# Patient Record
Sex: MALE | Race: WHITE | Employment: PART TIME | URBAN - NONMETROPOLITAN AREA
[De-identification: names, ages, dates, MRNs, and addresses within clinical notes are randomized per-mention and may not be internally consistent; named-entity substitution may affect disease eponyms.]

---

## 2024-05-27 ENCOUNTER — HOSPITAL ENCOUNTER (EMERGENCY)
Facility: HOSPITAL | Age: 29
Discharge: HOME | End: 2024-05-27
Attending: STUDENT IN AN ORGANIZED HEALTH CARE EDUCATION/TRAINING PROGRAM | Admitting: STUDENT IN AN ORGANIZED HEALTH CARE EDUCATION/TRAINING PROGRAM
Payer: COMMERCIAL

## 2024-05-27 ENCOUNTER — APPOINTMENT (OUTPATIENT)
Dept: GENERAL RADIOLOGY | Facility: HOSPITAL | Age: 29
End: 2024-05-27
Attending: STUDENT IN AN ORGANIZED HEALTH CARE EDUCATION/TRAINING PROGRAM
Payer: COMMERCIAL

## 2024-05-27 VITALS
BODY MASS INDEX: 29.94 KG/M2 | DIASTOLIC BLOOD PRESSURE: 97 MMHG | RESPIRATION RATE: 16 BRPM | SYSTOLIC BLOOD PRESSURE: 154 MMHG | WEIGHT: 244.38 LBS | OXYGEN SATURATION: 100 % | TEMPERATURE: 98 F | HEART RATE: 62 BPM

## 2024-05-27 DIAGNOSIS — R07.9 CHEST PAIN, UNSPECIFIED TYPE: ICD-10-CM

## 2024-05-27 PROCEDURE — 93005 ELECTROCARDIOGRAM TRACING: CPT

## 2024-05-27 PROCEDURE — 71046 X-RAY EXAM CHEST 2 VIEWS: CPT

## 2024-05-27 PROCEDURE — 93010 ELECTROCARDIOGRAM REPORT: CPT | Performed by: INTERNAL MEDICINE

## 2024-05-27 PROCEDURE — 99284 EMERGENCY DEPT VISIT MOD MDM: CPT | Performed by: STUDENT IN AN ORGANIZED HEALTH CARE EDUCATION/TRAINING PROGRAM

## 2024-05-27 PROCEDURE — 99284 EMERGENCY DEPT VISIT MOD MDM: CPT | Mod: 25

## 2024-05-27 ASSESSMENT — ACTIVITIES OF DAILY LIVING (ADL): ADLS_ACUITY_SCORE: 33

## 2024-05-27 ASSESSMENT — COLUMBIA-SUICIDE SEVERITY RATING SCALE - C-SSRS
2. HAVE YOU ACTUALLY HAD ANY THOUGHTS OF KILLING YOURSELF IN THE PAST MONTH?: NO
6. HAVE YOU EVER DONE ANYTHING, STARTED TO DO ANYTHING, OR PREPARED TO DO ANYTHING TO END YOUR LIFE?: NO
1. IN THE PAST MONTH, HAVE YOU WISHED YOU WERE DEAD OR WISHED YOU COULD GO TO SLEEP AND NOT WAKE UP?: NO

## 2024-05-27 NOTE — ED PROVIDER NOTES
ED Provider Note      History     Chief Complaint   Patient presents with    Chest Pain        HPI    Oc Pena is a 28 year old year old with no significant past med history who presents with chest pain.  Patient has had 2 days of intermittent chest pain.  He reports he been working night shift last 3 days has not been sleeping as well.  His pain is in the left side of his chest and does not radiate.  It feels like a pressure.  No history of high blood pressure, coronary disease, diabetes.  No family history of early sudden cardiac death.  Does not smoke.  No history of blood clots, malignancy, no lower extremity swelling.        A medically appropriate review of systems was performed with pertinent positives and negatives noted in the HPI, and all other systems negative.    Physical Exam   /97   Pulse 62   Temp 98  F (36.7  C) (Oral)   Resp 16   Wt 110.9 kg (244 lb 6.1 oz)   SpO2 100%   BMI 29.94 kg/m     Physical Exam  General: no acute distress.  HENT: MMM, no oropharyngeal lesions  Eyes: PERRL, normal sclerae  Neck: non-tender, supple  Cardio: Regular rate and rhythm. Extremities well perfused  Resp: Normal work of breathing, no accessory muscle use  Abdomen: non tender, non-distended, no rebound, no guarding  Neuro: alert and oriented. Grossly normal strength and sensation in all extremities.   MSK: no deformities. Grossly normal ROM in extremities.   Integumentary/Skin: no rashes or lesions      Procedures, & Data      Procedures      Medical Decision Making and ED Course    Patient is a healthy 28-year-old male who presents for chest pain for 2 days.  This is in the setting of not sleeping well as he is working night shift and significant caffeine intake.  Overall low suspicion for ACS as patient has minimal risk factors for this.  Did obtain an EKG which was reassuring and showed no signs of ischemia.  Patient has low Edacs score.  Given no significant CAD risk factors and normal EKG,  considered but deferred labs.  Chest x-ray obtained as noted below which was unremarkable.  Recommended outpatient follow-up and return if symptoms persist or he has any worsening pain.    ED Course as of 05/27/24 0925   Mon May 27, 2024   0728 EKG 12-lead, tracing only  Normal sinus rhythm, no signs of acute ischemic changes   0913 Chest XR,  PA & LAT  Chest X-ray interpreted independently and shows no infiltrate, pneumothorax, or pleural effusion           I have reviewed the nursing notes. I have reviewed the findings, diagnosis, and plan with the patient.    Impression   Final diagnoses:   Chest pain, unspecified type         James Chapa MD  May 27, 2024       James Chapa MD  05/27/24 0968

## 2024-05-27 NOTE — DISCHARGE INSTRUCTIONS
You were seen today for chest pain. As we discussed  -Your EKG does not show signs of a heart attack, and your chest x-ray appears normal  - Follow up with your regular doctor  - Try to get good sleep and avoid caffeine as this can contribute to your symptoms  -You can try Tylenol,500mg which may help the chest pain  - Please return to the emergency department if you have severe worsening in your symptoms

## 2024-05-27 NOTE — ED NOTES
Patient reports intermittent chest pain X 2 days. Reports mid-sternal chest pain that radiates out to both left and right side. Denies any other symptoms besides chest pain, denies any precipitating or alleviating factors, denies taking any pain relief meds.

## 2024-05-27 NOTE — ED TRIAGE NOTES
Pt presents with c/o chest pain that began 2 days ago, went away and then pain began yesterday and got worse through the night while he was at work.

## 2024-05-28 LAB
ATRIAL RATE - MUSE: 66 BPM
DIASTOLIC BLOOD PRESSURE - MUSE: NORMAL MMHG
INTERPRETATION ECG - MUSE: NORMAL
P AXIS - MUSE: 69 DEGREES
PR INTERVAL - MUSE: 152 MS
QRS DURATION - MUSE: 106 MS
QT - MUSE: 410 MS
QTC - MUSE: 429 MS
R AXIS - MUSE: 33 DEGREES
SYSTOLIC BLOOD PRESSURE - MUSE: NORMAL MMHG
T AXIS - MUSE: 33 DEGREES
VENTRICULAR RATE- MUSE: 66 BPM